# Patient Record
Sex: FEMALE | Race: WHITE
[De-identification: names, ages, dates, MRNs, and addresses within clinical notes are randomized per-mention and may not be internally consistent; named-entity substitution may affect disease eponyms.]

---

## 2020-09-14 ENCOUNTER — HOSPITAL ENCOUNTER (INPATIENT)
Dept: HOSPITAL 94 - ER | Age: 80
LOS: 1 days | Discharge: HOME | DRG: 69 | End: 2020-09-15
Attending: FAMILY MEDICINE | Admitting: FAMILY MEDICINE
Payer: MEDICARE

## 2020-09-14 VITALS — BODY MASS INDEX: 30.07 KG/M2 | WEIGHT: 198.42 LBS | HEIGHT: 68 IN

## 2020-09-14 VITALS — DIASTOLIC BLOOD PRESSURE: 63 MMHG | SYSTOLIC BLOOD PRESSURE: 172 MMHG

## 2020-09-14 VITALS — DIASTOLIC BLOOD PRESSURE: 80 MMHG | SYSTOLIC BLOOD PRESSURE: 195 MMHG

## 2020-09-14 DIAGNOSIS — E07.9: ICD-10-CM

## 2020-09-14 DIAGNOSIS — G45.9: Primary | ICD-10-CM

## 2020-09-14 DIAGNOSIS — Z90.49: ICD-10-CM

## 2020-09-14 DIAGNOSIS — Z88.8: ICD-10-CM

## 2020-09-14 DIAGNOSIS — Z79.82: ICD-10-CM

## 2020-09-14 DIAGNOSIS — E78.5: ICD-10-CM

## 2020-09-14 DIAGNOSIS — Z79.899: ICD-10-CM

## 2020-09-14 DIAGNOSIS — Z86.73: ICD-10-CM

## 2020-09-14 DIAGNOSIS — H53.8: ICD-10-CM

## 2020-09-14 DIAGNOSIS — I10: ICD-10-CM

## 2020-09-14 DIAGNOSIS — Z88.2: ICD-10-CM

## 2020-09-14 LAB
ALBUMIN SERPL BCP-MCNC: 3.6 G/DL (ref 3.4–5)
ALBUMIN/GLOB SERPL: 1 {RATIO} (ref 1.1–1.5)
ALP SERPL-CCNC: 91 IU/L (ref 46–116)
ALT SERPL W P-5'-P-CCNC: 17 U/L (ref 12–78)
ANION GAP SERPL CALCULATED.3IONS-SCNC: 4 MMOL/L (ref 8–16)
APTT PPP: 25 SECONDS (ref 22–32)
AST SERPL W P-5'-P-CCNC: 11 U/L (ref 10–37)
BASOPHILS # BLD AUTO: 0 X10'3 (ref 0–0.2)
BASOPHILS NFR BLD AUTO: 0.5 % (ref 0–1)
BILIRUB SERPL-MCNC: 0.6 MG/DL (ref 0.1–1)
BUN SERPL-MCNC: 13 MG/DL (ref 7–18)
BUN/CREAT SERPL: 20 (ref 6.6–38)
CALCIUM SERPL-MCNC: 9.1 MG/DL (ref 8.5–10.1)
CHLORIDE SERPL-SCNC: 106 MMOL/L (ref 99–107)
CHOLEST SERPL-MCNC: 105 MG/DL (ref 0–200)
CHOLEST/HDLC SERPL: 1.8 {RATIO} (ref 0–4.99)
CO2 SERPL-SCNC: 31.3 MMOL/L (ref 24–32)
CREAT SERPL-MCNC: 0.65 MG/DL (ref 0.4–0.9)
EOSINOPHIL # BLD AUTO: 0.2 X10'3 (ref 0–0.9)
EOSINOPHIL NFR BLD AUTO: 3.1 % (ref 0–6)
ERYTHROCYTE [DISTWIDTH] IN BLOOD BY AUTOMATED COUNT: 12.9 % (ref 11.5–14.5)
GFR SERPL CREATININE-BSD FRML MDRD: 88 ML/MIN
GLUCOSE SERPL-MCNC: 111 MG/DL (ref 70–104)
HBA1C MFR BLD: 6 % (ref 4.5–6.2)
HCT VFR BLD AUTO: 42.5 % (ref 35–45)
HDLC SERPL-MCNC: 57 MG/DL (ref 35–60)
HGB BLD-MCNC: 14.4 G/DL (ref 12–16)
LDLC SERPL DIRECT ASSAY-MCNC: 38 MG/DL (ref 50–100)
LYMPHOCYTES # BLD AUTO: 1.1 X10'3 (ref 1.1–4.8)
LYMPHOCYTES NFR BLD AUTO: 17.3 % (ref 21–51)
MCH RBC QN AUTO: 31.8 PG (ref 27–31)
MCHC RBC AUTO-ENTMCNC: 34 G/DL (ref 33–36.5)
MCV RBC AUTO: 93.8 FL (ref 78–98)
MONOCYTES # BLD AUTO: 0.7 X10'3 (ref 0–0.9)
MONOCYTES NFR BLD AUTO: 10.2 % (ref 2–12)
NEUTROPHILS # BLD AUTO: 4.5 X10'3 (ref 1.8–7.7)
NEUTROPHILS NFR BLD AUTO: 68.9 % (ref 42–75)
PLATELET # BLD AUTO: 217 X10'3 (ref 140–440)
PMV BLD AUTO: 8.2 FL (ref 7.4–10.4)
POTASSIUM SERPL-SCNC: 4.1 MMOL/L (ref 3.5–5.1)
PROT SERPL-MCNC: 7.2 G/DL (ref 6.4–8.2)
RBC # BLD AUTO: 4.54 X10'6 (ref 4.2–5.6)
SODIUM SERPL-SCNC: 141 MMOL/L (ref 135–145)
TRIGL SERPL-MCNC: 98 MG/DL (ref 20–135)
TROPONIN I SERPL-MCNC: < 0.04 NG/ML (ref 0–0.05)
WBC # BLD AUTO: 6.5 X10'3 (ref 4.5–11)

## 2020-09-14 PROCEDURE — 80053 COMPREHEN METABOLIC PANEL: CPT

## 2020-09-14 PROCEDURE — 96376 TX/PRO/DX INJ SAME DRUG ADON: CPT

## 2020-09-14 PROCEDURE — 85610 PROTHROMBIN TIME: CPT

## 2020-09-14 PROCEDURE — 70544 MR ANGIOGRAPHY HEAD W/O DYE: CPT

## 2020-09-14 PROCEDURE — 80048 BASIC METABOLIC PNL TOTAL CA: CPT

## 2020-09-14 PROCEDURE — 99285 EMERGENCY DEPT VISIT HI MDM: CPT

## 2020-09-14 PROCEDURE — 93306 TTE W/DOPPLER COMPLETE: CPT

## 2020-09-14 PROCEDURE — 96365 THER/PROPH/DIAG IV INF INIT: CPT

## 2020-09-14 PROCEDURE — 70496 CT ANGIOGRAPHY HEAD: CPT

## 2020-09-14 PROCEDURE — B3251ZZ COMPUTERIZED TOMOGRAPHY (CT SCAN) OF BILATERAL COMMON CAROTID ARTERIES USING LOW OSMOLAR CONTRAST: ICD-10-PCS

## 2020-09-14 PROCEDURE — 93005 ELECTROCARDIOGRAM TRACING: CPT

## 2020-09-14 PROCEDURE — B32G1ZZ COMPUTERIZED TOMOGRAPHY (CT SCAN) OF BILATERAL VERTEBRAL ARTERIES USING LOW OSMOLAR CONTRAST: ICD-10-PCS

## 2020-09-14 PROCEDURE — 84484 ASSAY OF TROPONIN QUANT: CPT

## 2020-09-14 PROCEDURE — 80061 LIPID PANEL: CPT

## 2020-09-14 PROCEDURE — 87081 CULTURE SCREEN ONLY: CPT

## 2020-09-14 PROCEDURE — 97535 SELF CARE MNGMENT TRAINING: CPT

## 2020-09-14 PROCEDURE — 85730 THROMBOPLASTIN TIME PARTIAL: CPT

## 2020-09-14 PROCEDURE — 92508 TX SP LANG VOICE COMM GROUP: CPT

## 2020-09-14 PROCEDURE — 83036 HEMOGLOBIN GLYCOSYLATED A1C: CPT

## 2020-09-14 PROCEDURE — 85025 COMPLETE CBC W/AUTO DIFF WBC: CPT

## 2020-09-14 PROCEDURE — 70498 CT ANGIOGRAPHY NECK: CPT

## 2020-09-14 PROCEDURE — 92616 FEES W/LARYNGEAL SENSE TEST: CPT

## 2020-09-14 PROCEDURE — 95816 EEG AWAKE AND DROWSY: CPT

## 2020-09-14 PROCEDURE — 70450 CT HEAD/BRAIN W/O DYE: CPT

## 2020-09-14 PROCEDURE — 97161 PT EVAL LOW COMPLEX 20 MIN: CPT

## 2020-09-14 PROCEDURE — B3281ZZ COMPUTERIZED TOMOGRAPHY (CT SCAN) OF BILATERAL INTERNAL CAROTID ARTERIES USING LOW OSMOLAR CONTRAST: ICD-10-PCS

## 2020-09-14 PROCEDURE — 70551 MRI BRAIN STEM W/O DYE: CPT

## 2020-09-14 PROCEDURE — 36415 COLL VENOUS BLD VENIPUNCTURE: CPT

## 2020-09-14 PROCEDURE — 71045 X-RAY EXAM CHEST 1 VIEW: CPT

## 2020-09-14 PROCEDURE — 93880 EXTRACRANIAL BILAT STUDY: CPT

## 2020-09-14 RX ADMIN — HEPARIN SODIUM SCH UNIT: 5000 INJECTION, SOLUTION INTRAVENOUS; SUBCUTANEOUS at 19:06

## 2020-09-14 RX ADMIN — SODIUM CHLORIDE SCH MLS/HR: 9 INJECTION INTRAMUSCULAR; INTRAVENOUS; SUBCUTANEOUS at 19:01

## 2020-09-14 RX ADMIN — Medication SCH EACH: at 13:00

## 2020-09-14 NOTE — NUR
[facility] TO ER #[id number] WITH C/O VISUAL DISTURBANCES SINCE LAST EVENING 
AROUND 2100. HX TIA AND CVA.

## 2020-09-14 NOTE — NUR
PAGER ID:  [phone] 

MESSAGE:  3000s Raymond takes metformin because her daughters had DM, im ordering A1C, 
neuro recommend EEG. Order that too? [id number]

## 2020-09-14 NOTE — NUR
CLARIFIED WITH EMIL KILGORE ABOUT THE METOPROL DOSE AS PT BP /80 AND HERE FOR 
STROKE LIKE SYMP AS PER EMIL KILGORE HOLD THE METOPROL AT THIS TIME,IF SYSTOLIC IS 
GREATER THAN 200-240 THEN GIVE THE DOSE.WILL FOLLOW THE ORDERS.

## 2020-09-14 NOTE — NUR
PAGER ID:  [phone] 

MESSAGE:  [id number] Winnie Andrade EEG not available until 12-1 tomorrow afternoon [id number]

## 2020-09-15 VITALS — SYSTOLIC BLOOD PRESSURE: 180 MMHG

## 2020-09-15 VITALS — SYSTOLIC BLOOD PRESSURE: 172 MMHG | DIASTOLIC BLOOD PRESSURE: 69 MMHG

## 2020-09-15 VITALS — DIASTOLIC BLOOD PRESSURE: 63 MMHG | SYSTOLIC BLOOD PRESSURE: 155 MMHG

## 2020-09-15 LAB
ALBUMIN SERPL BCP-MCNC: 3.4 G/DL (ref 3.4–5)
ANION GAP SERPL CALCULATED.3IONS-SCNC: 8 MMOL/L (ref 8–16)
BASOPHILS # BLD AUTO: 0 X10'3 (ref 0–0.2)
BASOPHILS NFR BLD AUTO: 0.3 % (ref 0–1)
BUN SERPL-MCNC: 11 MG/DL (ref 7–18)
BUN/CREAT SERPL: 16.4 (ref 6.6–38)
CALCIUM SERPL-MCNC: 8.6 MG/DL (ref 8.5–10.1)
CHLORIDE SERPL-SCNC: 108 MMOL/L (ref 99–107)
CO2 SERPL-SCNC: 27.3 MMOL/L (ref 24–32)
CREAT SERPL-MCNC: 0.67 MG/DL (ref 0.4–0.9)
EOSINOPHIL # BLD AUTO: 0.2 X10'3 (ref 0–0.9)
EOSINOPHIL NFR BLD AUTO: 2.7 % (ref 0–6)
ERYTHROCYTE [DISTWIDTH] IN BLOOD BY AUTOMATED COUNT: 13 % (ref 11.5–14.5)
GFR SERPL CREATININE-BSD FRML MDRD: 85 ML/MIN
GLUCOSE SERPL-MCNC: 108 MG/DL (ref 70–104)
HCT VFR BLD AUTO: 41.6 % (ref 35–45)
HGB BLD-MCNC: 14.3 G/DL (ref 12–16)
LYMPHOCYTES # BLD AUTO: 1.3 X10'3 (ref 1.1–4.8)
LYMPHOCYTES NFR BLD AUTO: 18.2 % (ref 21–51)
MCH RBC QN AUTO: 31.9 PG (ref 27–31)
MCHC RBC AUTO-ENTMCNC: 34.2 G/DL (ref 33–36.5)
MCV RBC AUTO: 93.3 FL (ref 78–98)
MONOCYTES # BLD AUTO: 0.7 X10'3 (ref 0–0.9)
MONOCYTES NFR BLD AUTO: 9.3 % (ref 2–12)
NEUTROPHILS # BLD AUTO: 5.1 X10'3 (ref 1.8–7.7)
NEUTROPHILS NFR BLD AUTO: 69.5 % (ref 42–75)
PLATELET # BLD AUTO: 213 X10'3 (ref 140–440)
PMV BLD AUTO: 8.6 FL (ref 7.4–10.4)
POTASSIUM SERPL-SCNC: 3.9 MMOL/L (ref 3.5–5.1)
RBC # BLD AUTO: 4.47 X10'6 (ref 4.2–5.6)
SODIUM SERPL-SCNC: 143 MMOL/L (ref 135–145)
WBC # BLD AUTO: 7.4 X10'3 (ref 4.5–11)

## 2020-09-15 PROCEDURE — 4A10X4Z MONITORING OF CENTRAL NERVOUS ELECTRICAL ACTIVITY, EXTERNAL APPROACH: ICD-10-PCS

## 2020-09-15 RX ADMIN — SODIUM CHLORIDE SCH MLS/HR: 9 INJECTION INTRAMUSCULAR; INTRAVENOUS; SUBCUTANEOUS at 11:00

## 2020-09-15 RX ADMIN — Medication SCH EACH: at 13:00

## 2020-09-15 RX ADMIN — HEPARIN SODIUM SCH UNIT: 5000 INJECTION, SOLUTION INTRAVENOUS; SUBCUTANEOUS at 09:45

## 2020-09-15 RX ADMIN — SODIUM CHLORIDE SCH MLS/HR: 9 INJECTION INTRAMUSCULAR; INTRAVENOUS; SUBCUTANEOUS at 04:13

## 2020-09-15 NOTE — NUR
DC INSTRUCTIONS GIVEN TO PT, QUESTIONS ANSWERED. IV REMOVED, CANULA INTACT, NO 
COMPLICATIONS. PERSONAL ITEMS GATHERED, PT DRESSED HERSELF AND WAS WHEELED DOWN TO PERSONAL 
VEHICLE IN STABLE CONDITION.

## 2020-09-15 NOTE — NUR
Patient in room [facility] 4024B. I have received report from RONN Jimenez   and had the opportunity 
to ask questions and assume patient care.

## 2020-09-17 NOTE — NUR
Case Management DC follow up: Spoke w/pt via telephone. s/p:TIA Pt Reports:"doing okay, eyes 
feel sore and tired". Denies: Acute/continuous CP, emergent SOB, resp distress, orthopnea, 
dyspnea, N/V, hematemesis, weakness, vertigo, syncope episodes, orthostatic hypotension 
(educated as per protocol), HA, blurry vision, s/s stroke/FAST, dysphagia, bladder pain, 
dysuria, polyuria, hematuria, retention, abd pain/distention, hematochezia, melena, 
unexplained bruising, bleeding, fever, chills.  Verbalizes understanding of Rx, why 
prescribed; continues/resumes current Rx as ordered, denies ase r/t polypharmacy. Verbalizes 
compliance w/aftercare. Pt agrees to cut down on excess sodium in diet, keep a log of BP 
readings to bring to [id number] up w/PCP. Verbalizes understanding of s/s that warrant 9-11/ER 
visit for further evaluation. Pt acknowledges need to schedule/confirm/keep follow up appt 
w/PCP/Dr Montez 09/22/20. Needs met, questions/concerns addressed at DC; No further 
questions/concerns r/t recent hospital stay and/or DC status at this time.